# Patient Record
Sex: FEMALE | Race: WHITE | NOT HISPANIC OR LATINO | URBAN - METROPOLITAN AREA
[De-identification: names, ages, dates, MRNs, and addresses within clinical notes are randomized per-mention and may not be internally consistent; named-entity substitution may affect disease eponyms.]

---

## 2021-10-13 ENCOUNTER — OFFICE VISIT (OUTPATIENT)
Dept: URGENT CARE | Facility: CLINIC | Age: 21
End: 2021-10-13

## 2021-10-13 VITALS
WEIGHT: 135 LBS | SYSTOLIC BLOOD PRESSURE: 121 MMHG | OXYGEN SATURATION: 99 % | HEIGHT: 67 IN | HEART RATE: 82 BPM | RESPIRATION RATE: 20 BRPM | DIASTOLIC BLOOD PRESSURE: 81 MMHG | TEMPERATURE: 99 F | BODY MASS INDEX: 21.19 KG/M2

## 2021-10-13 DIAGNOSIS — R06.02 SOB (SHORTNESS OF BREATH): ICD-10-CM

## 2021-10-13 DIAGNOSIS — R53.83 FATIGUE, UNSPECIFIED TYPE: ICD-10-CM

## 2021-10-13 DIAGNOSIS — R05.9 COUGH: Primary | ICD-10-CM

## 2021-10-13 LAB
CTP QC/QA: YES
SARS-COV-2 RDRP RESP QL NAA+PROBE: NEGATIVE

## 2021-10-13 PROCEDURE — 71046 XR CHEST PA AND LATERAL: ICD-10-PCS | Mod: FY,S$GLB,, | Performed by: RADIOLOGY

## 2021-10-13 PROCEDURE — 99203 PR OFFICE/OUTPT VISIT, NEW, LEVL III, 30-44 MIN: ICD-10-PCS | Mod: S$GLB,,, | Performed by: PHYSICIAN ASSISTANT

## 2021-10-13 PROCEDURE — U0002 COVID-19 LAB TEST NON-CDC: HCPCS | Mod: QW,S$GLB,, | Performed by: PHYSICIAN ASSISTANT

## 2021-10-13 PROCEDURE — 71046 X-RAY EXAM CHEST 2 VIEWS: CPT | Mod: FY,S$GLB,, | Performed by: RADIOLOGY

## 2021-10-13 PROCEDURE — U0002: ICD-10-PCS | Mod: QW,S$GLB,, | Performed by: PHYSICIAN ASSISTANT

## 2021-10-13 PROCEDURE — 99203 OFFICE O/P NEW LOW 30 MIN: CPT | Mod: S$GLB,,, | Performed by: PHYSICIAN ASSISTANT

## 2021-10-13 RX ORDER — ALBUTEROL SULFATE 90 UG/1
2 AEROSOL, METERED RESPIRATORY (INHALATION) EVERY 6 HOURS PRN
Qty: 18 G | Refills: 0 | Status: SHIPPED | OUTPATIENT
Start: 2021-10-13 | End: 2022-10-13

## 2021-10-13 RX ORDER — BENZONATATE 200 MG/1
200 CAPSULE ORAL 3 TIMES DAILY PRN
Qty: 30 CAPSULE | Refills: 0 | Status: SHIPPED | OUTPATIENT
Start: 2021-10-13 | End: 2021-10-23

## 2023-05-16 ENCOUNTER — HOSPITAL ENCOUNTER (EMERGENCY)
Facility: OTHER | Age: 23
Discharge: HOME | End: 2023-05-16
Attending: EMERGENCY MEDICINE

## 2023-05-16 VITALS
RESPIRATION RATE: 16 BRPM | DIASTOLIC BLOOD PRESSURE: 62 MMHG | BODY MASS INDEX: 20.4 KG/M2 | HEART RATE: 80 BPM | HEIGHT: 67 IN | SYSTOLIC BLOOD PRESSURE: 116 MMHG | TEMPERATURE: 98 F | OXYGEN SATURATION: 95 % | WEIGHT: 130 LBS
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
## 2023-05-16 DIAGNOSIS — S42.402A CLOSED FRACTURE OF LEFT ELBOW, INITIAL ENCOUNTER: Primary | ICD-10-CM

## 2023-05-16 LAB
B-HCG UR QL: NEGATIVE
CTP QC/QA: YES

## 2023-05-16 PROCEDURE — 29105 APPLICATION LONG ARM SPLINT: CPT | Mod: LT

## 2023-05-16 PROCEDURE — 99283 EMERGENCY DEPT VISIT LOW MDM: CPT | Mod: 25

## 2023-05-16 PROCEDURE — 25000003 PHARM REV CODE 250: Performed by: EMERGENCY MEDICINE

## 2023-05-16 PROCEDURE — 81025 URINE PREGNANCY TEST: CPT | Performed by: EMERGENCY MEDICINE

## 2023-05-16 RX ORDER — OXYCODONE AND ACETAMINOPHEN 10; 325 MG/1; MG/1
1 TABLET ORAL EVERY 6 HOURS PRN
Qty: 15 TABLET | Refills: 0 | Status: SHIPPED | OUTPATIENT
Start: 2023-05-16

## 2023-05-16 RX ORDER — OXYCODONE AND ACETAMINOPHEN 5; 325 MG/1; MG/1
1 TABLET ORAL
Status: COMPLETED | OUTPATIENT
Start: 2023-05-16 | End: 2023-05-16

## 2023-05-16 RX ADMIN — BACITRACIN ZINC, NEOMYCIN, POLYMYXIN B 1 EACH: 400; 3.5; 5 OINTMENT TOPICAL at 08:05

## 2023-05-16 RX ADMIN — OXYCODONE HYDROCHLORIDE AND ACETAMINOPHEN 1 TABLET: 5; 325 TABLET ORAL at 08:05

## 2023-05-16 NOTE — ED PROVIDER NOTES
"SCRIBE #1 NOTE: IPilar, am scribing for, and in the presence of,  Lennox Hubbard DO.       Source of History:  Patient.    Chief complaint:  Fall (Fall from back of a van ladder last PM, reports head strike on the ground, denies LOC. Reporting L elbow pain, poor ROM. )      HPI:  Dafne Duncan is a 23 y.o. female presenting with pain to her left elbow after a fall 9 hours ago. At 11pm yesterday she was climbing a ladder on a van when she fell onto concrete, hitting her head and left elbow. She denies loss of consciousness. She is unsure what height she fell from. She has not taken anything for the pain.    This is the extent to the patients complaints today here in the emergency department.    ROS:   See HPI.    Review of patient's allergies indicates:  No Known Allergies    PMH:  As per HPI and below:  History reviewed. No pertinent past medical history.  No past surgical history on file.    Social History     Tobacco Use    Smoking status: Never    Smokeless tobacco: Never   Substance Use Topics    Alcohol use: Yes    Drug use: Never       Physical Exam:    BP (!) 104/55 (BP Location: Left arm, Patient Position: Sitting)   Pulse 81   Temp 98.3 °F (36.8 °C) (Oral)   Resp 16   Ht 5' 7" (1.702 m)   Wt 59 kg (130 lb)   SpO2 96%   Breastfeeding No   BMI 20.36 kg/m²   Nursing note and vital signs reviewed.  Constitutional: No acute distress.  Nontoxic  Head:  Normocephalic atraumatic  Musculoskeletal: Holding left elbow at 90 degree angle. Tenderness to palpation of left elbow. Swelling near olecranon of left elbow with overlying abrasion. Pain with movement. No laceration. No pain to clavicle, shoulder, mid to proximal humerus, forearm, wrist, or hand.   Skin: No rashes seen.      Splint Application    Date/Time: 5/16/2023 9:14 AM  Performed by: Lennox Hubbard DO  Authorized by: Lennox Hubbard DO   Location details: left elbow  Splint type: long arm  Supplies used: " Ortho-Glass  Post-procedure: The splinted body part was neurovascularly unchanged following the procedure.  Patient tolerance: Patient tolerated the procedure well with no immediate complications  Comments: Placed by the nurse.  Re-evaluated by myself and the patient is neurovascularly intact.           MDM/ Differential Dx:   23-year-old female with fall to the left elbow.  Suspect fracture with possible dislocation.  Will get x-ray give analgesia.  My examination I do not see any other injuries as I have evaluated joints above and below the obvious injury.      ED Course as of 05/16/23 0915   Tue May 16, 2023   0757 Preg Test, Ur: Negative [SM]   0819 X-Ray Elbow Complete Left  X-ray of the left elbow independently interpreted by myself shows fracture of the proximal ulna with intra-articular involvement.  There is positive fat pad posteriorly as well as positive sail sign.  No radial head fracture seen. [SM]   0826 Updated the patient on the results.  [SM]   0826 The patient has just finished college in his moving back to [place] for graduate school in 2 weeks.  Let her know that it is strongly advised to follow up with Orthopedics prior to this in order to get re-evaluated to see if surgery is indicated or not. [SM]   0909 Discussed with Dr. Donis with Orthopedics who recommended the patient see their clinic this week before she leaves [facility]. [SM]   0915 No further workup is indicated in the emergency department today.  I updated pt regarding results and I counseled pt regarding supportive care measures.  Diagnosis and treatment plan explained to patient. I have answered all questions and the patient is satisfied with the plan of care. Patient discharged home in stable condition.  []      ED Course User Index  [] DO Alisa Galoibwinifred Attestation:   Scribe #1: I performed the above scribed service and the documentation accurately describes the services I performed. I attest to the  accuracy of the note.  Physician Attestation for Scribe: I, Dr Lennox Hubbard, reviewed documentation as scribed in my presence, which is both accurate and complete.    Diagnostic Impression:    1. Closed fracture of left elbow, initial encounter    2. Fall         ED Disposition Condition    Discharge Stable            ED Prescriptions       Medication Sig Dispense Start Date End Date Auth. Provider    oxyCODONE-acetaminophen (PERCOCET)  mg per tablet Take 1 tablet by mouth every 6 (six) hours as needed for Pain. 15 tablet 5/16/2023 -- Lennox Hubbard DO          Follow-up Information       Follow up With Specialties Details Why Contact Info    Raphael Donis MD Orthopedic Surgery Call today schedule appointment for this week [facility] [facility] [id number]  [phone]               Lennox Hubbard DO  05/16/23 0915

## 2023-05-16 NOTE — ED TRIAGE NOTES
24 yo female reports to ed with reports of climbing onto a van/ladder last night around 11pm and falling onto her L side. Reports elbow pain, unable to move arm. Denies pain to clavicle/wrist. Swelling and abrasion noted to area. Reports hitting head, denies head pain/blurred vision/n/v. Aaox4.